# Patient Record
Sex: FEMALE | Race: WHITE | Employment: OTHER | URBAN - METROPOLITAN AREA
[De-identification: names, ages, dates, MRNs, and addresses within clinical notes are randomized per-mention and may not be internally consistent; named-entity substitution may affect disease eponyms.]

---

## 2019-09-27 ENCOUNTER — OFFICE VISIT (OUTPATIENT)
Dept: SLEEP MEDICINE | Facility: CLINIC | Age: 74
End: 2019-09-27
Payer: MEDICARE

## 2019-09-27 VITALS
HEART RATE: 76 BPM | HEIGHT: 70 IN | RESPIRATION RATE: 16 BRPM | BODY MASS INDEX: 36.08 KG/M2 | SYSTOLIC BLOOD PRESSURE: 158 MMHG | WEIGHT: 252 LBS | OXYGEN SATURATION: 95 % | DIASTOLIC BLOOD PRESSURE: 76 MMHG

## 2019-09-27 DIAGNOSIS — G47.33 OSA (OBSTRUCTIVE SLEEP APNEA): Primary | ICD-10-CM

## 2019-09-27 PROCEDURE — 99204 OFFICE O/P NEW MOD 45 MIN: CPT | Performed by: INTERNAL MEDICINE

## 2019-09-27 RX ORDER — AMLODIPINE BESYLATE 10 MG/1
10 TABLET ORAL
COMMUNITY
Start: 2019-02-26

## 2019-09-27 RX ORDER — LIRAGLUTIDE 6 MG/ML
INJECTION SUBCUTANEOUS
COMMUNITY
Start: 2019-09-02

## 2019-09-27 RX ORDER — GABAPENTIN 100 MG/1
CAPSULE ORAL
Refills: 1 | COMMUNITY
Start: 2019-06-25

## 2019-09-27 RX ORDER — FUROSEMIDE 20 MG
TABLET ORAL
COMMUNITY
Start: 2019-01-15

## 2019-09-27 RX ORDER — ATORVASTATIN CALCIUM 20 MG/1
20 TABLET, FILM COATED ORAL DAILY
Refills: 3 | COMMUNITY
Start: 2019-09-20

## 2019-09-27 RX ORDER — METOPROLOL SUCCINATE 50 MG/1
TABLET, EXTENDED RELEASE ORAL
Refills: 0 | COMMUNITY
Start: 2019-03-28

## 2019-09-27 RX ORDER — INSULIN GLARGINE 100 [IU]/ML
22 INJECTION, SOLUTION SUBCUTANEOUS
COMMUNITY
Start: 2019-04-26

## 2019-09-27 RX ORDER — METFORMIN HCL 500 MG
2000 TABLET, EXTENDED RELEASE 24 HR ORAL
COMMUNITY
Start: 2019-08-05

## 2019-09-27 RX ORDER — DULOXETIN HYDROCHLORIDE 30 MG/1
CAPSULE, DELAYED RELEASE ORAL
Refills: 0 | COMMUNITY
Start: 2019-04-25 | End: 2021-10-01

## 2019-09-27 RX ORDER — LORAZEPAM 0.5 MG/1
TABLET ORAL
COMMUNITY
Start: 2018-12-11 | End: 2021-10-01

## 2019-09-27 RX ORDER — BUPROPION HYDROCHLORIDE 150 MG/1
150 TABLET ORAL
COMMUNITY
Start: 2019-09-09

## 2019-09-27 RX ORDER — LEVOTHYROXINE SODIUM 150 UG/1
150 TABLET ORAL
COMMUNITY
Start: 2019-03-26

## 2019-09-27 RX ORDER — POTASSIUM CHLORIDE 1500 MG/1
TABLET, EXTENDED RELEASE ORAL
COMMUNITY
Start: 2019-01-15

## 2019-09-27 ASSESSMENT — MIFFLIN-ST. JEOR: SCORE: 1710.37

## 2019-09-27 NOTE — PROGRESS NOTES
Sleep Consultation:    Date on this visit: 9/27/2019    Shobha Juares is sent by No ref. provider found for a sleep consultation regarding sleep apnea.    Primary Physician: Mary Harris     Chief complaint: sleep apnea    Presenting History:     Shobha Juares presents to clinic today to establish care for her sleep apnea which she is treating with CPAP.     Her medical history is significant for HTN, depression, DM_2 and hypothyroidism.      Sleep apnea was diagnosed in 2005 at [facility]. She was told that she has severe sleep apnea. Previous test results are not available for review.     Overall, she rates the experience with PAP as 8 (0 poor, 10 great). The mask is comfortable. The mask is leaking, some nights per week.  She is snoring with the mask on. She is not having gasp arousals.  She is not having significant oral/nasal dryness. The pressure settings are comfortable.     She uses nasal pillows.     She does feel rested in the morning.    [place] Sleepiness Scale: 2/24    ResMed     Auto-PAP 11-15 cmH2O download:  90/90 total days of use. 0 nonuse days. 90/90 days with >4 hours use.  Average use 9 hours 40 minutes per day. Median Leak 36 L/min. 95%ile Leak 64 L/min. CPAP 95% pressure 13.1cm. AHI 0.2    Shobha goes to sleep at 11:30 PM during the week. She wakes up at 9:00 AM without an alarm. She falls asleep in 30- 60 minutes.  Shobha has difficulty falling asleep.  She wakes up 2-4 times a night for 20 minutes before falling back to sleep.  Shobha wakes up to go to the bathroom.  On weekends, Shobha goes to sleep at 12:00 AM.  She wakes up at 9:00 AM without an alarm. She falls asleep in 60 minutes.  Patient gets an average of 7 hours of sleep per night.     Patient sleeps on her back. Shobha denies any bruxism, sleep walking, sleep talking, dream enactment, sleep paralysis, cataplexy and hypnogogic/hypnopompic hallucinations.    Shobha denies difficulty breathing through her nose.       Shobha naps 5-6 times per week for  minutes, feels refreshed after naps. She takes no inadvertant naps.  She denies closing eyes, dozing and falling asleep while driving. Patient was counseled on the importance of driving while alert, to pull over if drowsy, or nap before getting into the vehicle if sleepy.      She uses 1-2 cups/day of tea. Last caffeine intake is usually before 4 pm.    Allergies:    Allergies   Allergen Reactions     Oxycodone Itching     Trazodone      Piroxicam Rash     Triamterene Rash     Generalized rash  PN: LW Reaction: Rash, Generalized         Medications:    Current Outpatient Medications   Medication Sig Dispense Refill     amLODIPine (NORVASC) 10 MG tablet Take 10 mg by mouth       aspirin (ASA) 81 MG tablet Take 1 tablet by mouth every 24 hours       buPROPion (WELLBUTRIN XL) 150 MG 24 hr tablet Take 150 mg by mouth       furosemide (LASIX) 20 MG tablet TAKE 1 TABLET BY MOUTH EVERY 24 HOURS       insulin glargine (BASAGLAR KWIKPEN) 100 UNIT/ML pen Inject 22 Units Subcutaneous       levothyroxine (SYNTHROID/LEVOTHROID) 150 MCG tablet Take 150 mcg by mouth       liraglutide (VICTOZA PEN) 18 MG/3ML solution INJECT 1.8MG UNDER THE SKIN EVERY DAY       LORazepam (ATIVAN) 0.5 MG tablet TAKE 1 TABLET BY MOUTH 4 TO 5 TIMES DAILY AS NEEDED       metFORMIN (GLUCOPHAGE-XR) 500 MG 24 hr tablet Take 2,000 mg by mouth       potassium chloride ER (K-DUR/KLOR-CON M) 20 MEQ CR tablet TAKE 1 TABLET BY MOUTH EVERY 24 HOURS       atorvastatin (LIPITOR) 20 MG tablet Take 20 mg by mouth daily  3     DULoxetine (CYMBALTA) 30 MG capsule TAKE 3 CAPSULES BY MOUTH ONCE DAILY  0     gabapentin (NEURONTIN) 100 MG capsule TAKE 1 TO 2 CAPSULES BY MOUTH 2-3 TIMES A DAY  1     metoprolol succinate ER (TOPROL-XL) 50 MG 24 hr tablet TAKE 1 TABLET BY MOUTH TWICE A DAY  0       Problem List:  There are no active problems to display for this patient.       Past Medical/Surgical History:    1. Hypertension  2.  DM_2  3. Depression   4. Hypothyroidism     Social History:  Social History     Socioeconomic History     Marital status:      Spouse name: Not on file     Number of children: Not on file     Years of education: Not on file     Highest education level: Not on file   Occupational History     Not on file   Social Needs     Financial resource strain: Not on file     Food insecurity:     Worry: Not on file     Inability: Not on file     Transportation needs:     Medical: Not on file     Non-medical: Not on file   Tobacco Use     Smoking status: Former Smoker     Packs/day: 0.00     Smokeless tobacco: Never Used     Tobacco comment: long time ago, short amount of time   Substance and Sexual Activity     Alcohol use: Not on file     Drug use: Not on file     Sexual activity: Not on file   Lifestyle     Physical activity:     Days per week: Not on file     Minutes per session: Not on file     Stress: Not on file   Relationships     Social connections:     Talks on phone: Not on file     Gets together: Not on file     Attends Tenriism service: Not on file     Active member of club or organization: Not on file     Attends meetings of clubs or organizations: Not on file     Relationship status: Not on file     Intimate partner violence:     Fear of current or ex partner: Not on file     Emotionally abused: Not on file     Physically abused: Not on file     Forced sexual activity: Not on file   Other Topics Concern     Not on file   Social History Narrative     Not on file       Family History:    - Father had sleep apnea     Review of Systems:  A complete review of systems reviewed by me is negative with the exeption of what has been mentioned in the history of present illness.  CONSTITUTIONAL: NEGATIVE for weight gain/loss, fever, chills, sweats or night sweats, drug allergies.  EYES: NEGATIVE for changes in vision, blind spots, double vision.  ENT: NEGATIVE for ear pain, sore throat, sinus pain, post-nasal drip,  "runny nose, bloody nose  CARDIAC:  POSITIVE for  swollen legs  NEUROLOGIC:  POSITIVE for  headaches and weakness or numbness in the arms or legs  DERMATOLOGIC: NEGATIVE for rashes, new moles or change in mole(s)  PULMONARY:  POSITIVE for  SOB with activity  GASTROINTESTINAL: NEGATIVE for nausea or vomitting, loose or watery stools, fat or grease in stools, constipation, abdominal pain, bowel movements black in color or blood noted.  GENITOURINARY: NEGATIVE for pain during urination, blood in urine, urinating more frequently than usual, irregular menstrual periods.  MUSCULOSKELETAL:  POSITIVE for  bone or joint pain  ENDOCRINE: NEGATIVE for increased thirst or urination, diabetes.  LYMPHATIC: NEGATIVE for swollen lymph nodes, lumps or bumps in the breasts or nipple discharge.    Physical Examination:  Vitals: BP (!) 158/76   Pulse 76   Resp 16   Ht 1.765 m (5' 9.5\")   Wt 114.3 kg (252 lb)   SpO2 95%   BMI 36.68 kg/m    BMI= Body mass index is 36.68 kg/m .    Neck Cir (cm): 38 cm    [place] Total Score 9/27/2019   Total score - [place] 2       JUVE Total Score: 21 (09/27/19 1525)    GENERAL APPEARANCE: healthy, alert and no distress  EYES: Eyes grossly normal to inspection, PERRL and conjunctivae and sclerae normal  HENT: nose and mouth without ulcers or lesions, oropharynx crowded, soft palate dependent and tongue base enlarged  NECK: no adenopathy, no asymmetry, masses, or scars and thyroid normal to palpation  RESP: lungs clear to auscultation - no rales, rhonchi or wheezes  CV: regular rates and rhythm, normal S1 S2, no S3 or S4 and no murmur, click or rub  ABDOMEN: soft, nontender, without hepatosplenomegaly or masses and bowel sounds normal  MS: extremities normal- no gross deformities noted  NEURO: Normal strength and tone, mentation intact and speech normal  PSYCH: mentation appears normal and affect normal/bright  Mallampati Class: IV.  Tonsillar Stage: 1  hidden by pillars.    Impression/Plan:    1. " Obstructive sleep apnea  2. Hypertension   3. DM-2   4. Depression     -  Patient is reports to have severe sleep apnea at baseline. She is on auto titrating PAP therapy, which she uses regularly and report subjective benefit. However, mouth breathing is an issue. She doesnot tolerate full face mask or chin strap.     Adequate treatment of her sleep apnea is indicated by Regular compliance and normal AHI demonstrated on download.     Plan:     1.  Continue auto PAP therapy 11- 15 cm H2O.   2. She wants to transfer DME services. A copy of her previous PSG result is requested to facilitate this     Obstructive sleep apnea reviewed.  CPAP download reviewed.   Complications of untreated sleep apnea were reviewed.    Dr. Kai Mohan    CC: No ref. provider found

## 2019-09-27 NOTE — PATIENT INSTRUCTIONS
Your BMI is Body mass index is 36.68 kg/m .  Weight management is a personal decision.  If you are interested in exploring weight loss strategies, the following discussion covers the approaches that may be successful. Body mass index (BMI) is one way to tell whether you are at a healthy weight, overweight, or obese. It measures your weight in relation to your height.  A BMI of 18.5 to 24.9 is in the healthy range. A person with a BMI of 25 to 29.9 is considered overweight, and someone with a BMI of 30 or greater is considered obese. More than two-thirds of American adults are considered overweight or obese.  Being overweight or obese increases the risk for further weight gain. Excess weight may lead to heart disease and diabetes.  Creating and following plans for healthy eating and physical activity may help you improve your health.  Weight control is part of healthy lifestyle and includes exercise, emotional health, and healthy eating habits. Careful eating habits lifelong are the mainstay of weight control. Though there are significant health benefits from weight loss, long-term weight loss with diet alone may be very difficult to achieve- studies show long-term success with dietary management in less than 10% of people. Attaining a healthy weight may be especially difficult to achieve in those with severe obesity. In some cases, medications, devices and surgical management might be considered.  What can you do?  If you are overweight or obese and are interested in methods for weight loss, you should discuss this with your provider.     Consider reducing daily calorie intake by 500 calories.     Keep a food journal.     Avoiding skipping meals, consider cutting portions instead.    Diet combined with exercise helps maintain muscle while optimizing fat loss. Strength training is particularly important for building and maintaining muscle mass. Exercise helps reduce stress, increase energy, and improves fitness.  Increasing exercise without diet control, however, may not burn enough calories to loose weight.       Start walking three days a week 10-20 minutes at a time    Work towards walking thirty minutes five days a week     Eventually, increase the speed of your walking for 1-2 minutes at time    In addition, we recommend that you review healthy lifestyles and methods for weight loss available through the National Institutes of Health patient information sites:  http://win.niddk.nih.gov/publications/index.htm    And look into health and wellness programs that may be available through your health insurance provider, employer, local community center, or shawn club.    Weight management plan: Patient was referred to their PCP to discuss a diet and exercise plan.   Shobha to follow up with Primary Care provider regarding elevated blood pressure.

## 2019-09-27 NOTE — NURSING NOTE
"Chief Complaint   Patient presents with     Sleep Problem     Establish care, need supplies       Initial BP (!) 158/76   Pulse 76   Resp 16   Ht 1.765 m (5' 9.5\")   Wt 114.3 kg (252 lb)   SpO2 95%   BMI 36.68 kg/m   Estimated body mass index is 36.68 kg/m  as calculated from the following:    Height as of this encounter: 1.765 m (5' 9.5\").    Weight as of this encounter: 114.3 kg (252 lb).    Medication Reconciliation: complete     ESS 2  Neck 38cm  Fabiola Ramos MA        "

## 2019-09-30 ENCOUNTER — DOCUMENTATION ONLY (OUTPATIENT)
Dept: SLEEP MEDICINE | Facility: CLINIC | Age: 74
End: 2019-09-30

## 2019-09-30 NOTE — PROGRESS NOTES
Previous sleep test results from [facility] were reviewed.     PSG was on 07/01/2003 at weight 198 lbs.     AHI was 100 per hour with adequate PAP titration at 10 cm H2O.     Impression: Severe TOM at baseline with  per hour in 2003.

## 2020-10-01 NOTE — PROGRESS NOTES
"Shobha Juares is a 75 year old female who is being evaluated via a billable telephone visit.      The patient has been notified of following:     \"This telephone visit will be conducted via a call between you and your physician/provider. We have found that certain health care needs can be provided without the need for a physical exam.  This service lets us provide the care you need with a short phone conversation.  If a prescription is necessary we can send it directly to your pharmacy.  If lab work is needed we can place an order for that and you can then stop by our lab to have the test done at a later time.    Telephone visits are billed at different rates depending on your insurance coverage. During this emergency period, for some insurers they may be billed the same as an in-person visit.  Please reach out to your insurance provider with any questions.    If during the course of the call the physician/provider feels a telephone visit is not appropriate, you will not be charged for this service.\"    Patient has given verbal consent for Telephone visit?  Yes    What phone number would you like to be contacted at? [phone]    How would you like to obtain your AVS? MyChart    Phone call duration: 15 minutes    Kai Mohan MD      Obstructive Sleep Apnea - PAP Follow-Up Visit:    Chief Complaint   Patient presents with     RECHECK     reese, ess added 9/23/2020  annual pap        Shobha Juares comes in today for follow-up of their severe sleep apnea, managed with CPAP.     PSG on 7/01/2003 at weight of 198 lbs showed an AHI of 100 per hour.     Overall, she rates the experience with PAP as ok. The mask is comfortable. The mask is not leaking.  She is not snoring with the mask on. She is not having gasp arousals.  She is not having significant oral/nasal dryness. The pressure settings are comfortable.     She uses nasal pillows.     Bedtime is typically 11:30 pm. Usually it takes about 60 minutes to fall " asleep with the mask on. Patient has sleep maintenance difficulty and has light interrupted sleep after waking up between 2-3 am. Wake time is typically 8:30 am.  She is currently in the process of tapering and discontinuing Lorazepam. Patient is using PAP therapy 9 hours per night. The patient is usually getting 8- 9 hours of sleep per night.    She does feel rested in the morning.    Total score - [place]: 6 (9/23/2020  1:56 PM)    ResMed     Auto-PAP 11-15 cmH2O download:  61/90 total days of use. 68% days with >4 hours use.  Average use 9 hours 15 minutes per day. Median Leak 34 L/min. 95%ile Leak 60.5 L/min. CPAP 95% pressure 11.6cm. AHI 0.1      Reviewed by team:  Allergies  Meds            Reviewed by provider:                Problem List:  There are no active problems to display for this patient.         There were no vitals taken for this visit.    Impression/Plan:     Severe sleep apnea.     -  Tolerating PAP well. Daytime symptoms are stable.    - I reviewed data from her CPAP device which shows regular compliance and normal AHI indicating adequate treatment.     - Her CPAP is reported to be malfunctioning and she is in need of replacement.     Plan:    1. Continue auto PAP therapy. She will obtain replacement device through [facility] will follow up in about 1 year(s).     Fifteen minutes spent with patient, all of which were spent  counseling, consulting, coordinating plan of care.      Kai Mohan MD, MD    CC:  Mary Harris,

## 2020-10-02 ENCOUNTER — VIRTUAL VISIT (OUTPATIENT)
Dept: SLEEP MEDICINE | Facility: CLINIC | Age: 75
End: 2020-10-02
Payer: MEDICARE

## 2020-10-02 DIAGNOSIS — G47.33 OSA (OBSTRUCTIVE SLEEP APNEA): Primary | ICD-10-CM

## 2020-10-02 PROCEDURE — 99442 PR PHYSICIAN TELEPHONE EVALUATION 11-20 MIN: CPT | Performed by: INTERNAL MEDICINE

## 2020-10-02 NOTE — PATIENT INSTRUCTIONS
Your There is no height or weight on file to calculate BMI.  Weight management is a personal decision.  If you are interested in exploring weight loss strategies, the following discussion covers the approaches that may be successful. Body mass index (BMI) is one way to tell whether you are at a healthy weight, overweight, or obese. It measures your weight in relation to your height.  A BMI of 18.5 to 24.9 is in the healthy range. A person with a BMI of 25 to 29.9 is considered overweight, and someone with a BMI of 30 or greater is considered obese. More than two-thirds of American adults are considered overweight or obese.  Being overweight or obese increases the risk for further weight gain. Excess weight may lead to heart disease and diabetes.  Creating and following plans for healthy eating and physical activity may help you improve your health.  Weight control is part of healthy lifestyle and includes exercise, emotional health, and healthy eating habits. Careful eating habits lifelong are the mainstay of weight control. Though there are significant health benefits from weight loss, long-term weight loss with diet alone may be very difficult to achieve- studies show long-term success with dietary management in less than 10% of people. Attaining a healthy weight may be especially difficult to achieve in those with severe obesity. In some cases, medications, devices and surgical management might be considered.  What can you do?  If you are overweight or obese and are interested in methods for weight loss, you should discuss this with your provider.     Consider reducing daily calorie intake by 500 calories.     Keep a food journal.     Avoiding skipping meals, consider cutting portions instead.    Diet combined with exercise helps maintain muscle while optimizing fat loss. Strength training is particularly important for building and maintaining muscle mass. Exercise helps reduce stress, increase energy, and  improves fitness. Increasing exercise without diet control, however, may not burn enough calories to loose weight.       Start walking three days a week 10-20 minutes at a time    Work towards walking thirty minutes five days a week     Eventually, increase the speed of your walking for 1-2 minutes at time    In addition, we recommend that you review healthy lifestyles and methods for weight loss available through the National Institutes of Health patient information sites:  http://win.niddk.nih.gov/publications/index.htm    And look into health and wellness programs that may be available through your health insurance provider, employer, local community center, or shawn club.

## 2020-10-06 ENCOUNTER — DOCUMENTATION ONLY (OUTPATIENT)
Dept: SLEEP MEDICINE | Facility: CLINIC | Age: 75
End: 2020-10-06

## 2020-10-06 NOTE — PROGRESS NOTES
10/6/2020- [facility]  SHE WILL RECIVE  A CALL TO SCHEDULE REPLACEMENT.   I TOLD HER TO CALL US IN A WEEK IF SHE HAS NOT RECD CALL.

## 2020-10-09 ENCOUNTER — TELEPHONE (OUTPATIENT)
Dept: SLEEP MEDICINE | Facility: CLINIC | Age: 75
End: 2020-10-09

## 2020-10-09 NOTE — TELEPHONE ENCOUNTER
I called patient to schedule CPAP replacement setup appointment [facility]. Left voicemail for her to call me back at my direct line to schedule.

## 2020-10-16 ENCOUNTER — APPOINTMENT (OUTPATIENT)
Dept: SLEEP MEDICINE | Facility: CLINIC | Age: 75
End: 2020-10-16
Payer: MEDICARE

## 2020-10-16 ENCOUNTER — DOCUMENTATION ONLY (OUTPATIENT)
Dept: SLEEP MEDICINE | Facility: CLINIC | Age: 75
End: 2020-10-16

## 2020-10-16 NOTE — PROGRESS NOTES
Patient was offered choice of vendor and chose [facility].  Patient Shobha Juares was set up at [facility]  on October 16, 2020. Patient received a Resmed AirSense 10 Auto. Pressures were set at 11-15 cm H2O.   Patient s ramp is off and FLEX/EPR is EPR, 2.  Patient received a Resmed Mask name: Sanon FX Elo Pillow mask size Standard, heated tubing and heated humidifier.  Patient does not need to meet compliance. Patient has a follow up tbd.   Melsysa Ramirez

## 2020-10-19 ENCOUNTER — DOCUMENTATION ONLY (OUTPATIENT)
Dept: SLEEP MEDICINE | Facility: CLINIC | Age: 75
End: 2020-10-19
Payer: MEDICARE

## 2020-10-19 NOTE — PROGRESS NOTES
3 DAY STM VISIT    Diagnostic AHI:   PSG    Patient contacted for 3 day STM visit.    Confirmed with patient at time of call- N/A Patient is still interested in STM service.     Message left for patient to return call.    Replacement device: Yes  STM ordered by provider: Yes     Device type: Auto-CPAP  PAP settings from order::  CPAP min 11 cm  H20       CPAP max 15 cm  H20  Mask type:    Nasal Pillows     Device settings from machine      Min CPAP 11.0            Max CPAP 15.0          EPR level Setting: TWO      RESMED Soft response setting:  OFF  Assessment: No usage reporting but has a profile in [employer]view/[employer].  Action plan: Patient to have 14 day STM visit. Patient has a follow up visit scheduled:   no.     Total time spent on accessing and  interpreting remote patient PAP therapy data  10 minutes  Total time spent counseling, coaching  and reviewing PAP therapy data with patient  1 minutes  [id number] no                HPI      ROS      Physical Exam

## 2020-11-03 ENCOUNTER — DOCUMENTATION ONLY (OUTPATIENT)
Dept: SLEEP MEDICINE | Facility: CLINIC | Age: 75
End: 2020-11-03

## 2020-11-03 NOTE — PROGRESS NOTES
14  DAY STM VISIT         Subjective measures:   Patient reports machine is working fine.  She has gone back to her old mask.      Assessment: Pt meeting objective benchmarks.  Patient meeting subjective benchmarks.     Action plan: schedule mask fit appointment follow up pt provider request. Removed from further STM as this was a replacement        Device type: Auto-CPAP    PAP settings: CPAP min 11.0 cm  H20       CPAP max 15.0 cm  H20      95th% pressure 12.3 cm  H20        RESMED EPR level Setting: TWO    RESMED Soft response setting:  OFF    Mask type:  Nasal Pillows    Objective measures: 14 day rolling measures      Compliance  92 %      Leak  60.09  lpm  last  upload      AHI 0.36   last  upload      Average number of minutes 559      Objective measure goal  Compliance   Goal >70%  Leak   Goal < 24 lpm  AHI  Goal < 5  Usage  Goal >240        Total time spent on accessing and interpreting remote patient PAP therapy data  10 minutes    Total time spent counseling, coaching  and reviewing PAP therapy data with patient  3 minutes    [id number]  [id number]  no (3 day STM)

## 2020-11-16 ENCOUNTER — HEALTH MAINTENANCE LETTER (OUTPATIENT)
Age: 75
End: 2020-11-16

## 2021-09-18 ENCOUNTER — HEALTH MAINTENANCE LETTER (OUTPATIENT)
Age: 76
End: 2021-09-18

## 2021-10-01 VITALS — HEIGHT: 70 IN | WEIGHT: 240 LBS | BODY MASS INDEX: 34.36 KG/M2

## 2021-10-01 ASSESSMENT — MIFFLIN-ST. JEOR: SCORE: 1640.94

## 2021-10-01 NOTE — PROGRESS NOTES
"Shobha is a 76 year old who is being evaluated via a billable video visit.      How would you like to obtain your AVS? MyChart  If the video visit is dropped, the invitation should be resent by: Text to cell phone: [phone]  Will anyone else be joining your video visit? No      Video Start Time: 11:35 AM  Video-Visit Details    Type of service:  Video Visit    Video End Time:11:45 AM    Originating Location (pt. Location): Home    Distant Location (provider location):  [facility] [place]     Platform used for Video Visit: Well    Obstructive Sleep Apnea - PAP Follow-Up Visit:    Chief Complaint   Patient presents with     CPAP Follow Up       Shobha Juares comes in today for follow-up of their severe sleep apnea, managed with CPAP.     PSG on 7/01/2003 at weight of 198 lbs showed an AHI of 100 per hour.     Overall, she rates the experience with PAP as ok. The mask is not comfortable. The mask is not leaking.  She is not snoring with the mask on. She is not having gasp arousals.  She is not having significant oral/nasal dryness. The pressure settings are comfortable.     She uses nasal pillows.     Bedtime is typically 11:30 PM. Usually it takes about 30-60 minutes to fall asleep with the mask on. Wake time is typically 5:30 am.  Patient thinks that she is usually awake for the rest of the morning until leaving the bed at 7:30 am.    She does feel rested in the morning.    Total score - [place]: 5 (10/1/2021 11:42 AM)    ResMed     Auto-PAP 11-15 cmH2O download:  30/30 total days of use. 0 nonuse days. 100% days with >4 hours use.  Average use 9 hours 21 minutes per day. Median Leak 45 L/min. 95%ile Leak 63 L/min. CPAP 95% pressure 12.9cm. AHI 0.2      Reviewed by team: Tobacco  Allergies  Meds            Reviewed by provider:                Problem List:  There are no problems to display for this patient.         Ht 1.765 m (5' 9.5\")   Wt 108.9 kg (240 lb)   BMI 34.93 kg/m  "     Impression/Plan:     Severe sleep apnea.     -  Patient is tolerating PAP and download data shows regular compliance and normal AHI. She tends to open her mouth and there seems to be some mouth leak with use of nasal pillows. She has tried chinstrap which did not help. Currently, [facility] applies tape to seal her mouth.     Plan:     - Continue auto PAP therapy   - Patient will try a full face mask through DME    Shobha Juares will follow up in about 1 year(s).     I spent a total of 22 minutes for this appointment today which include time spent before, during and after the visit for patient care, counseling and coordination of care.    Kai Mohan MD    CC:  Mary Harris,

## 2021-10-01 NOTE — PATIENT INSTRUCTIONS
Your BMI is Body mass index is 34.93 kg/m .    What is BMI?  Body mass index (BMI) is one way to tell whether you are at a healthy weight, overweight, or obese. It measures your weight in relation to your height.  A BMI of 18.5 to 24.9 is in the healthy range. A person with a BMI of 25 to 29.9 is considered overweight, and someone with a BMI of 30 or greater is considered obese.  Another way to find out if you are at risk for health problems caused by overweight and obesity is to measure your waist. If you are a woman and your waist is more than 35 inches, or if you are a man and your waist is more than 40 inches, your risk of disease may be higher.  More than two-thirds of American adults are considered overweight or obese. Being overweight or obese increases the risk for further weight gain.  Excess weight may lead to heart disease and diabetes. Creating and following plans for healthy eating and physical activity may help you improve your health.    Methods for maintaining or losing weight.  Weight control is part of healthy lifestyle and includes exercise, emotional health, and healthy eating habits.  Careful eating habits lifelong is the mainstay of weight control.  Though there are significant health benefits from weight loss, long-term weight loss with diet alone may be very difficult to achieve- studies show long-term success with dietary management in less than 10% of people. Attaining a healthy weight may be especially difficult to achieve in those with severe obesity. In some cases, medications, devices and surgical management might be considered.    What can you do?  If you are overweight or obese and are interested in methods for weight loss, you should discuss this with your provider. In addition, we recommend that you review healthy life styles and methods for weight loss available through the National Institutes of Health patient information sites:    http://win.niddk.nih.gov/publications/index.htm            Your Body mass index is 34.93 kg/m .  Weight management is a personal decision.  If you are interested in exploring weight loss strategies, the following discussion covers the approaches that may be successful. Body mass index (BMI) is one way to tell whether you are at a healthy weight, overweight, or obese. It measures your weight in relation to your height.  A BMI of 18.5 to 24.9 is in the healthy range. A person with a BMI of 25 to 29.9 is considered overweight, and someone with a BMI of 30 or greater is considered obese. More than two-thirds of American adults are considered overweight or obese.  Being overweight or obese increases the risk for further weight gain. Excess weight may lead to heart disease and diabetes.  Creating and following plans for healthy eating and physical activity may help you improve your health.  Weight control is part of healthy lifestyle and includes exercise, emotional health, and healthy eating habits. Careful eating habits lifelong are the mainstay of weight control. Though there are significant health benefits from weight loss, long-term weight loss with diet alone may be very difficult to achieve- studies show long-term success with dietary management in less than 10% of people. Attaining a healthy weight may be especially difficult to achieve in those with severe obesity. In some cases, medications, devices and surgical management might be considered.  What can you do?  If you are overweight or obese and are interested in methods for weight loss, you should discuss this with your provider.     Consider reducing daily calorie intake by 500 calories.     Keep a food journal.     Avoiding skipping meals, consider cutting portions instead.    Diet combined with exercise helps maintain muscle while optimizing fat loss. Strength training is particularly important for building and maintaining muscle mass. Exercise helps  reduce stress, increase energy, and improves fitness. Increasing exercise without diet control, however, may not burn enough calories to loose weight.       Start walking three days a week 10-20 minutes at a time    Work towards walking thirty minutes five days a week     Eventually, increase the speed of your walking for 1-2 minutes at time    In addition, we recommend that you review healthy lifestyles and methods for weight loss available through the National Institutes of Health patient information sites:  http://win.niddk.nih.gov/publications/index.htm    And look into health and wellness programs that may be available through your health insurance provider, employer, local community center, or shawn club.    {Weight Management Plan -- Delete if patient has a normal BMI:330978}

## 2021-10-04 ENCOUNTER — VIRTUAL VISIT (OUTPATIENT)
Dept: SLEEP MEDICINE | Facility: CLINIC | Age: 76
End: 2021-10-04
Payer: MEDICARE

## 2021-10-04 DIAGNOSIS — G47.33 OSA (OBSTRUCTIVE SLEEP APNEA): Primary | ICD-10-CM

## 2021-10-04 PROCEDURE — 99213 OFFICE O/P EST LOW 20 MIN: CPT | Mod: 95 | Performed by: INTERNAL MEDICINE

## 2021-10-04 NOTE — NURSING NOTE
1 year appointment reminder card created and will be mailed when appropriate. DME orders have been automatically faxed to [facility].  Dorita Rueda, CMA

## 2022-01-08 ENCOUNTER — HEALTH MAINTENANCE LETTER (OUTPATIENT)
Age: 77
End: 2022-01-08

## 2022-06-10 ENCOUNTER — TELEPHONE (OUTPATIENT)
Dept: SLEEP MEDICINE | Facility: CLINIC | Age: 77
End: 2022-06-10
Payer: MEDICARE

## 2022-06-10 NOTE — TELEPHONE ENCOUNTER
[facility] (Pulmonary and Sleep) NURSE CALLED AND WANTED TO SEE [facility]. WENT INTO [facility] AND MADE [facility] AN INTEGRATOR AND GAVE THEM ALL ACCESS TO [facility] FOR CONTINUATION OF CARE. -ARS

## 2022-11-20 ENCOUNTER — HEALTH MAINTENANCE LETTER (OUTPATIENT)
Age: 77
End: 2022-11-20

## 2024-09-18 ENCOUNTER — TELEPHONE (OUTPATIENT)
Dept: WOUND CARE | Facility: CLINIC | Age: 79
End: 2024-09-18
Payer: MEDICARE

## 2024-09-18 NOTE — TELEPHONE ENCOUNTER
Does the patient have an open and draining wound? Yes    Please schedule with Fani Daley D.P.M. or Luis Belcher D.P.M. at [facility] [place] for next available appointment.    **For all providers, please schedule a follow up 4 weeks after initial appointment. (2-3 weeks for Dr. Daley and Dr. Belcher)**  --If unable to schedule within 2-3 weeks then please place on cancellation list--    Is the patient able to make their own medical decisions? Yes    Can the patient be scheduled on a Thursday (Jak/Mason (starting in November))? Yes    Is patient a [facility] lift? PLEASE INQUIRE WHEN MAKING THE APPOINTMENT AND PUT IN APPOINTMENT NOTES    Routing to  Wound Healing Scheduling.

## 2024-09-18 NOTE — TELEPHONE ENCOUNTER
New patient requesting an appointment for a wound on the 2nd toe of the right foot. Patient states that it is currently open and draining.

## 2024-09-27 ENCOUNTER — HOSPITAL ENCOUNTER (OUTPATIENT)
Dept: WOUND CARE | Facility: CLINIC | Age: 79
Discharge: HOME | End: 2024-09-27
Attending: ASSISTANT, PODIATRIC | Admitting: ASSISTANT, PODIATRIC
Payer: MEDICARE

## 2024-09-27 VITALS
HEART RATE: 67 BPM | SYSTOLIC BLOOD PRESSURE: 138 MMHG | DIASTOLIC BLOOD PRESSURE: 64 MMHG | WEIGHT: 225.6 LBS | HEIGHT: 70 IN | BODY MASS INDEX: 32.3 KG/M2 | TEMPERATURE: 96.9 F

## 2024-09-27 DIAGNOSIS — L89.893 PRESSURE ULCER OF TOE OF RIGHT FOOT, STAGE 3 (H): Primary | ICD-10-CM

## 2024-09-27 DIAGNOSIS — E11.621 TYPE 2 DIABETES MELLITUS WITH FOOT ULCER (CODE) (H): ICD-10-CM

## 2024-09-27 PROBLEM — L97.512 ULCER OF RIGHT SECOND TOE WITH FAT LAYER EXPOSED (H): Status: ACTIVE | Noted: 2024-09-27

## 2024-09-27 PROCEDURE — 11042 DBRDMT SUBQ TIS 1ST 20SQCM/<: CPT | Performed by: ASSISTANT, PODIATRIC

## 2024-09-27 PROCEDURE — G0463 HOSPITAL OUTPT CLINIC VISIT: HCPCS | Mod: 25

## 2024-09-27 NOTE — PROGRESS NOTES
[facility] [place] Progress Note    Subject: Patient was seen at wound center.  Patient is an 78-year-old diabetic female seen today for evaluation of right plantar second toe wound.  Patient states that she believes the wound developed because she has toes that are rubbing on each other and pushing on each other.  This has been going on for years but the wound has developed earlier in the summer.  Has been open all summer.  Patient notes that she has been keeping an eye on it, keeping it clean and covered but it does not seem to want to heal fully.  Patient denies any increased redness, swelling or significant drainage from the area.  Denies any malodor or overall feeling of unwell/illness.  She denies any trauma to the area, does not believe she stepped on something or got anything stuck in the wound area.  States she wears wide toe box shoes or open toe shoes mostly.  Avoids being barefoot around the house if possible.  Tries to check her feet daily, has been completing wound care on her own.  States there is pain to the wound sometimes rated 3 or 4 out of 10 but mostly is minimal.  Reports some numbness and tingling to her toes at times.    PMH: No past medical history on file.    Social Hx:   Social History     Socioeconomic History    Marital status:      Spouse name: Not on file    Number of children: Not on file    Years of education: Not on file    Highest education level: Not on file   Occupational History    Not on file   Tobacco Use    Smoking status: Former     Types: Cigarettes    Smokeless tobacco: Never    Tobacco comments:     long time ago, short amount of time   Substance and Sexual Activity    Alcohol use: Not on file    Drug use: Not on file    Sexual activity: Not on file   Other Topics Concern    Not on file   Social History Narrative    Not on file     Social Determinants of Health     Financial Resource Strain: Not on file   Food Insecurity: Not on file   Transportation  Needs: Not on file   Physical Activity: Not on file   Stress: Not on file   Social Connections: Not on file   Interpersonal Safety: Low Risk  (9/27/2024)    Interpersonal Safety     Do you feel physically and emotionally safe where you currently live?: Yes     Within the past 12 months, have you been hit, slapped, kicked or otherwise physically hurt by someone?: No     Within the past 12 months, have you been humiliated or emotionally abused in other ways by your partner or ex-partner?: No   Housing Stability: Not on file       Surgical Hx: No past surgical history on file.    Allergies:    Allergies   Allergen Reactions    Oxycodone Itching    Trazodone     Piroxicam Rash    Triamterene Rash     Generalized rash  PN: LW Reaction: Rash, Generalized         Medications:   Current Outpatient Medications   Medication Sig Dispense Refill    amLODIPine (NORVASC) 10 MG tablet Take 10 mg by mouth      aspirin (ASA) 81 MG tablet Take 1 tablet by mouth every 24 hours      atorvastatin (LIPITOR) 20 MG tablet Take 20 mg by mouth daily  3    buPROPion (WELLBUTRIN XL) 150 MG 24 hr tablet Take 150 mg by mouth      furosemide (LASIX) 20 MG tablet TAKE 1 TABLET BY MOUTH EVERY 24 HOURS      gabapentin (NEURONTIN) 100 MG capsule TAKE 1 TO 2 CAPSULES BY MOUTH 2-3 TIMES A DAY  1    insulin glargine (BASAGLAR KWIKPEN) 100 UNIT/ML pen Inject 22 Units Subcutaneous      levothyroxine (SYNTHROID/LEVOTHROID) 150 MCG tablet Take 150 mcg by mouth      liraglutide (VICTOZA PEN) 18 MG/3ML solution INJECT 1.8MG UNDER THE SKIN EVERY DAY      metFORMIN (GLUCOPHAGE-XR) 500 MG 24 hr tablet Take 2,000 mg by mouth      metoprolol succinate ER (TOPROL-XL) 50 MG 24 hr tablet TAKE 1 TABLET BY MOUTH TWICE A DAY  0    potassium chloride ER (K-DUR/KLOR-CON M) 20 MEQ CR tablet TAKE 1 TABLET BY MOUTH EVERY 24 HOURS       No current facility-administered medications for this encounter.         Objective:  Vitals:  /64 (BP Location: Left arm, Patient  "Position: Sitting, Cuff Size: Adult Large)   Pulse 67   Temp 96.9  F (36.1  C) (Temporal)   Ht 1.765 m (5' 9.5\")   Wt 102.3 kg (225 lb 9.6 oz)   BMI 32.84 kg/m      A1C: No results found for: \"A1C\"    General:  Patient is alert and orientated.  NAD, kids appropriately.    Dermatologic: Skin overall is slightly shiny, cool but with good turgor and overall coloration.  Do not notice any significant dryness but there is some mild dryness to the lower extremity and feet.  No open fissuring.  Wound as noted below is located to the plantar lateral aspect of the second toe.,  Bleeds well with debridement, no malodor or infectious drainage noted.    .   Wound (used by OP [facility] only) 09/27/24 1339 2 toe Right plantar pressure injury (Active)   Thickness/Stage Stage 3 09/27/24 1300   Base granulating 09/27/24 1300   Periwound macerated 09/27/24 1300   Periwound Temperature warm 09/27/24 1300   Periwound Skin Turgor soft 09/27/24 1300   Edges open 09/27/24 1300   Length (cm) 0.4 09/27/24 1300   Width (cm) 0.2 09/27/24 1300   Depth (cm) 0.3 09/27/24 1300   Wound (cm^2) 0.08 cm^2 09/27/24 1300   Wound Volume (cm^3) 0.02 cm^3 09/27/24 1300   Drainage Characteristics/Odor serosanguineous 09/27/24 1300   Drainage Amount small 09/27/24 1300   Care, Wound debrided 09/27/24 1300          Vascular: DP & PT pulses are difficult to palpate bilateral but no significant edema or varicosities noted.  CFT is less than 3 seconds to the toes, skin temperature overall is slightly cool to touch bilateral lower extremity.     Neurologic: Lower extremity sensation is overall intact to light touch but is diminished distally, did not feel much debridement with 15 blade today.  No evidence of weakness or contracture in the lower extremities.  There is some evidence of neuropathy likely distally     Musculoskeletal: Patient is ambulatory and uses a cane for assistance, overall no major deformity of foot she does have a pes planus foot type " bilaterally with some generalized osteophyte areas over her joints, typical for her age.    Imaging:    No specific imaging based on clinical exam today.    Cultures:    No drainage no need to culture    Assessment:   78-year-old diabetic with wound to plantar toe, this is likely due to to pressure, her first toe is overriding the second which is pushing the second down and over into the third toe likely causing this wound.  I think that a simple Iodosorb dressing covered and a bandage change daily will be good enough for wound care but we need to offload the area.  I recommended silicone toe spacers between the first and second toes.  As well as silicone toe spacer between the second and third toe with spacers between the second and third toe being a priority as this is where the rubbing is occurring.  She is to continue with wider toe box shoes or open toe shoes to prevent rubbing.  Would want her to monitor for signs or symptoms of infection, I believe that with good offloading this has potential to heal.  With debridement today the wound bled well and overall appears healthy.  If with good offloading and good wound care this wound stays stagnant or worsens may warrant getting noninvasive vascular studies.  But at this time we will trial wound care and see how she does.  No need for antibiotic    Plan:    -Iodosorb dressing and bandage change daily  -Monitor for signs or symptoms of infection, present to ED with any signs or symptoms of infection  -Recommend silicone toe spacers to be used between the second and third toe and between the first and second if she can tolerate both, with second and third toe being priority.    -Will see her back in a few weeks for evaluation    Procedure:  After verbal consent, per protocol lidocaine was applied to the wound. Excisional debridement was performed on ulcer.  #15 blade was used to debride ulcer down to and including subcutaneous tissue, and rid slough over wound, wound  is full-thickness to level of subcutaneous tissue. Bleeding controlled with light pressure.  No drainage noted.  < 20sq cm debrided.  Dry dressing applied to foot.  Patient tolerated procedure well.    Luis Belcher DPM            Further instructions from your care team         09/27/2024   Shobha Juares   1945    A DME order for supplies has been placed to [employer]. If there are any issues with your order including not receiving the order please call our clinic at [phone]. Do not call [facility]. We are better able to help you. We can contact the [facility] rep to better assist than if you call the general [facility] number. We can provide a tracking number also if needed.     [facility] is now sending an ancillary kit free of charge. This kit includes gauze, gloves, and saline. You will receive 1 kit per 15 days of the supply order. We typically order 30 days of supplies so you will receive 2 kits. Please let us know if you would not like to receive this kit and we can communicate this to [facility].    Dressing changes outside of clinic are being performed by Patient    Plan 09/27/2024   We highly advise that you purchase silicone toe spacers (from [employer]) and place them between the 1st and 2nd toes and the 2nd and 3rd toes.  Otherwise fold gauze and tuck between your toes to help keep them     Wound Dressing Change: Right plantar second toe  - Wash your hands with soap and water before you begin your dressing change and prepare a clean surface for dressings.  -Cleanse with mild unscented soap and water (such as Cetaphil, Cerave or Dove)   -Primary dressing: Apply a small dab of Iodosorb gel to the wound bed using a q-tip or gauze  -Secondary dressing: Cover with a 2x2.375 Medipore+ Pad  Change daily and as needed with soiling/saturation     Main Provider: Luis Belcher D.P.M. September 27, 2024    Call us at [phone] if you have any questions about your wounds, if you have redness or swelling around  your wound, have a fever of 101 degrees Fahrenheit or greater or if you have any other problems or concerns. We answer the phone Monday through Friday 8 am to 4 pm, please leave a message as we check the voicemail frequently throughout the day. If you have a concern over the weekend, please leave a message and we will return your call Monday. If the need is urgent, go to the ER or urgent care.    If you had a positive experience please indicate that on your patient satisfaction survey form that [facility] will be sending you.    It was a pleasure meeting with you today.  Thank you for allowing me and my team the privilege of caring for you today.  YOU are the reason we are here, and I truly hope we provided you with the excellent service you deserve.  Please let us know if there is anything else we can do for you so that we can be sure you are leaving completely satisfied with your care experience.      If you have any billing related questions please call the [facility] Business office at [phone]. The clinic staff does not handle billing related matters.    If you are scheduled to have a follow up appointment, you will receive a reminder call the day before your visit. On the appointment day please arrive 15 minutes prior to your appointment time. If you are unable to keep that appointment, please call the clinic to cancel or reschedule. If you are more than 10 minutes late or greater for your scheduled appointment time, the clinic policy is that you may be asked to reschedule.

## 2024-09-27 NOTE — DISCHARGE INSTRUCTIONS
09/27/2024   Shobha Juares   1945    A DME order for supplies has been placed to [facility]. If there are any issues with your order including not receiving the order please call our clinic at [phone]. Do not call [facility]. We are better able to help you. We can contact the [facility] rep to better assist than if you call the general [facility] number. We can provide a tracking number also if needed.     [facility] is now sending an ancillary kit free of charge. This kit includes gauze, gloves, and saline. You will receive 1 kit per 15 days of the supply order. We typically order 30 days of supplies so you will receive 2 kits. Please let us know if you would not like to receive this kit and we can communicate this to [facility].    Dressing changes outside of clinic are being performed by Patient    Plan 09/27/2024   We highly advise that you purchase silicone toe spacers (from [employer]) and place them between the 1st and 2nd toes and the 2nd and 3rd toes.  Otherwise fold gauze and tuck between your toes to help keep them     Wound Dressing Change: Right plantar second toe  - Wash your hands with soap and water before you begin your dressing change and prepare a clean surface for dressings.  -Cleanse with mild unscented soap and water (such as Cetaphil, Cerave or Dove)   -Primary dressing: Apply a small dab of Iodosorb gel to the wound bed using a q-tip or gauze  -Secondary dressing: Cover with a 2x2.375 Medipore+ Pad  Change daily and as needed with soiling/saturation     Main Provider: JAYLAN ElizaldePBAUDILIO. September 27, 2024    Call us at [phone] if you have any questions about your wounds, if you have redness or swelling around your wound, have a fever of 101 degrees Fahrenheit or greater or if you have any other problems or concerns. We answer the phone Monday through Friday 8 am to 4 pm, please leave a message as we check the voicemail frequently throughout the day. If you have a concern over the  weekend, please leave a message and we will return your call Monday. If the need is urgent, go to the ER or urgent care.    If you had a positive experience please indicate that on your patient satisfaction survey form that [facility] will be sending you.    It was a pleasure meeting with you today.  Thank you for allowing me and my team the privilege of caring for you today.  YOU are the reason we are here, and I truly hope we provided you with the excellent service you deserve.  Please let us know if there is anything else we can do for you so that we can be sure you are leaving completely satisfied with your care experience.      If you have any billing related questions please call the [facility] Business office at [phone]. The clinic staff does not handle billing related matters.    If you are scheduled to have a follow up appointment, you will receive a reminder call the day before your visit. On the appointment day please arrive 15 minutes prior to your appointment time. If you are unable to keep that appointment, please call the clinic to cancel or reschedule. If you are more than 10 minutes late or greater for your scheduled appointment time, the clinic policy is that you may be asked to reschedule.

## 2024-09-27 NOTE — PROGRESS NOTES
Patient arrived for wound care visit. Certified Wound Care Nurse time spent evaluating patient record, completed a full evaluation and documented wound(s) & jessica-wound skin; provided recommendation based on treatment plan. Applied dressing, reviewed discharge instructions, patient education, and discussed plan of care with appropriate medical team staff members and patient and/or family members.

## 2024-10-18 ENCOUNTER — HOSPITAL ENCOUNTER (OUTPATIENT)
Dept: WOUND CARE | Facility: CLINIC | Age: 79
Discharge: HOME | End: 2024-10-18
Attending: ASSISTANT, PODIATRIC | Admitting: ASSISTANT, PODIATRIC
Payer: MEDICARE

## 2024-10-18 VITALS
SYSTOLIC BLOOD PRESSURE: 149 MMHG | HEART RATE: 78 BPM | DIASTOLIC BLOOD PRESSURE: 78 MMHG | TEMPERATURE: 97.7 F | RESPIRATION RATE: 17 BRPM

## 2024-10-18 DIAGNOSIS — L89.893 PRESSURE ULCER OF TOE OF RIGHT FOOT, STAGE 3 (H): Primary | ICD-10-CM

## 2024-10-18 DIAGNOSIS — L84 CORNS AND CALLOSITIES: ICD-10-CM

## 2024-10-18 DIAGNOSIS — E13.42 DIABETIC POLYNEUROPATHY ASSOCIATED WITH OTHER SPECIFIED DIABETES MELLITUS (H): ICD-10-CM

## 2024-10-18 PROCEDURE — 99213 OFFICE O/P EST LOW 20 MIN: CPT | Mod: 25 | Performed by: ASSISTANT, PODIATRIC

## 2024-10-18 PROCEDURE — 11055 PARING/CUTG B9 HYPRKER LES 1: CPT | Performed by: ASSISTANT, PODIATRIC

## 2024-10-18 NOTE — DISCHARGE INSTRUCTIONS
10/18/2024   Shobha Juares   1945    A DME order was not completed because the patient declined the need for supplies    Dressing changes outside of clinic are being performed by Patient    Plan 10/18/2024   We highly advise that you purchase silicone toe spacers (from [employer]) and place them between the 1st and 2nd toes and the 2nd and 3rd toes.    Otherwise fold gauze and tuck between your toes to help keep them      Wound Dressing Change: Right plantar second toe  - Wash your hands with soap and water before you begin your dressing change and prepare a clean surface for dressings.  - Cleanse with mild unscented soap and water (such as Cetaphil, Cerave or Dove)   - Primary dressing: Apply 1/30 of a 4x5 piece of hydrofera blue transfer ready cut to fit the wound bed  - Secondary dressing: Cover with a 2x2.375 Medipore+ Pad  Change daily and as needed with soiling/saturation         Main Provider: Luis Belcher D.P.M. October 18, 2024    Call us at [phone] if you have any questions about your wounds, if you have redness or swelling around your wound, have a fever of 101 degrees Fahrenheit or greater or if you have any other problems or concerns. We answer the phone Monday through Friday 8 am to 4 pm, please leave a message as we check the voicemail frequently throughout the day. If you have a concern over the weekend, please leave a message and we will return your call Monday. If the need is urgent, go to the ER or urgent care.    If you had a positive experience please indicate that on your patient satisfaction survey form that [facility] will be sending you.    It was a pleasure meeting with you today.  Thank you for allowing me and my team the privilege of caring for you today.  YOU are the reason we are here, and I truly hope we provided you with the excellent service you deserve.  Please let us know if there is anything else we can do for you so that we can be sure you are leaving  completely satisfied with your care experience.      If you have any billing related questions please call the [facility] Business office at [phone]. The clinic staff does not handle billing related matters.    If you are scheduled to have a follow up appointment, you will receive a reminder call the day before your visit. On the appointment day please arrive 15 minutes prior to your appointment time. If you are unable to keep that appointment, please call the clinic to cancel or reschedule. If you are more than 10 minutes late or greater for your scheduled appointment time, the clinic policy is that you may be asked to reschedule.

## 2024-10-18 NOTE — PROGRESS NOTES
[facility] Wound Healing [place] Progress Note    Subject: Patient was seen at wound center.  78-year-old diabetic type 2 female seen today for right plantar second toe wound follow-up.  She has been doing well.  She did buy toe spacers to try to use them, noted some discomfort.  Has been keeping the area clean and covered with a Band-Aid and antibiotic ointment.  She tried to use the other ointment but was having difficulty getting it on.  Denies any signs or symptoms of infection    PMH: No past medical history on file.    Social Hx:   Social History     Socioeconomic History    Marital status:      Spouse name: Not on file    Number of children: Not on file    Years of education: Not on file    Highest education level: Not on file   Occupational History    Not on file   Tobacco Use    Smoking status: Former     Types: Cigarettes    Smokeless tobacco: Never    Tobacco comments:     long time ago, short amount of time   Substance and Sexual Activity    Alcohol use: Not on file    Drug use: Not on file    Sexual activity: Not on file   Other Topics Concern    Not on file   Social History Narrative    Not on file     Social Determinants of Health     Financial Resource Strain: Not on file   Food Insecurity: Not on file   Transportation Needs: Not on file   Physical Activity: Not on file   Stress: Not on file   Social Connections: Not on file   Interpersonal Safety: Low Risk  (10/18/2024)    Interpersonal Safety     Do you feel physically and emotionally safe where you currently live?: Yes     Within the past 12 months, have you been hit, slapped, kicked or otherwise physically hurt by someone?: No     Within the past 12 months, have you been humiliated or emotionally abused in other ways by your partner or ex-partner?: No   Housing Stability: Not on file       Surgical Hx: No past surgical history on file.    Allergies:    Allergies   Allergen Reactions    Oxycodone Itching    Trazodone     Piroxicam Rash     Triamterene Rash     Generalized rash  PN: LW Reaction: Rash, Generalized         Medications:   Current Outpatient Medications   Medication Sig Dispense Refill    amLODIPine (NORVASC) 10 MG tablet Take 10 mg by mouth      aspirin (ASA) 81 MG tablet Take 1 tablet by mouth every 24 hours      atorvastatin (LIPITOR) 20 MG tablet Take 20 mg by mouth daily  3    buPROPion (WELLBUTRIN XL) 150 MG 24 hr tablet Take 150 mg by mouth      furosemide (LASIX) 20 MG tablet TAKE 1 TABLET BY MOUTH EVERY 24 HOURS      gabapentin (NEURONTIN) 100 MG capsule TAKE 1 TO 2 CAPSULES BY MOUTH 2-3 TIMES A DAY  1    insulin glargine (BASAGLAR KWIKPEN) 100 UNIT/ML pen Inject 22 Units Subcutaneous      levothyroxine (SYNTHROID/LEVOTHROID) 150 MCG tablet Take 150 mcg by mouth      liraglutide (VICTOZA PEN) 18 MG/3ML solution INJECT 1.8MG UNDER THE SKIN EVERY DAY      metFORMIN (GLUCOPHAGE-XR) 500 MG 24 hr tablet Take 2,000 mg by mouth      metoprolol succinate ER (TOPROL-XL) 50 MG 24 hr tablet TAKE 1 TABLET BY MOUTH TWICE A DAY  0    potassium chloride ER (K-DUR/KLOR-CON M) 20 MEQ CR tablet TAKE 1 TABLET BY MOUTH EVERY 24 HOURS       No current facility-administered medications for this encounter.         Objective:  Vitals:  BP (!) 149/78 (BP Location: Left arm, Patient Position: Sitting, Cuff Size: Adult Regular)   Pulse 78   Temp 97.7  F (36.5  C) (Temporal)   Resp 17     A1C:     General:  Patient is alert and orientated.  NAD     Dermatologic:     Skin overall is slightly shiny, cool but with good turgor and overall coloration. Do not notice any significant dryness but there is some mild dryness to the lower extremity and feet. No open fissuring. Wound as noted below is located to the plantar lateral aspect of the second toe., Bleeds well with debridement and with good 100% granular tissue  .   Wound (used by OP [facility] only) 09/27/24 1339 2 toe Right plantar pressure injury (Active)   Thickness/Stage Stage 3 10/18/24 1501   Base  granulating 10/18/24 1501   Periwound macerated 10/18/24 1501   Periwound Temperature warm 10/18/24 1501   Periwound Skin Turgor soft 10/18/24 1501   Edges open 10/18/24 1501   Length (cm) 0.4 10/18/24 1501   Width (cm) 0.1 10/18/24 1501   Depth (cm) 0.4 10/18/24 1501   Wound (cm^2) 0.04 cm^2 10/18/24 1501   Wound Volume (cm^3) 0.02 cm^3 10/18/24 1501   Wound healing % 50 10/18/24 1501   Drainage Characteristics/Odor serosanguineous 10/18/24 1501   Drainage Amount small 10/18/24 1501   Care, Wound debrided 10/18/24 1501     Vascular: DP & PT pulses are difficult to palpate bilateral but no significant edema or varicosities noted.  CFT is less than 3 seconds to the toes, skin temperature overall is slightly cool to touch bilateral lower extremity.     Neurologic: Lower extremity sensation is overall intact to light touch but is diminished distally, did not feel much debridement with 15 blade today.  No evidence of weakness or contracture in the lower extremities.  There is some evidence of neuropathy likely distally     Musculoskeletal: Patient is ambulatory and uses a cane for assistance, overall no major deformity of foot she does have a pes planus foot type bilaterally with some generalized osteophyte areas over her joints, typical for her age.      No specific imaging or cultures to review at this time    Assessment:   #Type 2 diabetes mellitus with neuropathy  #Ulcer Right 2nd toe with fat layer exposed    Shobha is doing well overall, she did purchase toe spacers and has been trying to use them as best as she can.  Overall her skin actually looks improved to the area this time.  There is some callus and macerated callus over the right second toe wound.  I do believe it is still being caused by rubbing with the third toe.  We cleaned up the callus around there and debrided the base a little bit today.  We removed fibrotic tissue over the base she has a nice granular bleeding wound base.  Will continue simple  dressing change will use Hydrofera Blue and bandage.  Encouraged her to use gauze in between the toes and silicone sleeves to help offload and offer space between the toes so they do not rub.    Plan:    -Hydrofera Blue dressing as noted below  -Would like her to be off her feet is much as possible  -Monitor for signs or symptoms of infection  -Please use gauze or silicone toe sleeves to help offload between the toes    Procedure:  After verbal consent, per protocol lidocaine was applied to the wound area.  I used a 15 blade to debride the hyperkeratotic tissue around the wound.  I was able to clear this to reveal a granular wound base that does not need to be specifically debrided at this time.  Patient tolerated this well.    Luis Belcher DPM              Further instructions from your care team         10/18/2024   Shobha Juares   1945    A DME order was not completed because the patient declined the need for supplies    Dressing changes outside of clinic are being performed by Patient    Plan 10/18/2024   We highly advise that you purchase silicone toe spacers (from [employer]) and place them between the 1st and 2nd toes and the 2nd and 3rd toes.    Otherwise fold gauze and tuck between your toes to help keep them      Wound Dressing Change: Right plantar second toe  - Wash your hands with soap and water before you begin your dressing change and prepare a clean surface for dressings.  - Cleanse with mild unscented soap and water (such as Cetaphil, Cerave or Dove)   - Primary dressing: Apply 1/30 of a 4x5 piece of hydrofera blue transfer ready cut to fit the wound bed  - Secondary dressing: Cover with a 2x2.375 Medipore+ Pad  Change daily and as needed with soiling/saturation         Main Provider: JAYLAN [email or web address]. October 18, 2024    Call us at [phone] if you have any questions about your wounds, if you have redness or swelling around your wound, have a fever of 101 degrees Fahrenheit or  greater or if you have any other problems or concerns. We answer the phone Monday through Friday 8 am to 4 pm, please leave a message as we check the voicemail frequently throughout the day. If you have a concern over the weekend, please leave a message and we will return your call Monday. If the need is urgent, go to the ER or urgent care.    If you had a positive experience please indicate that on your patient satisfaction survey form that [facility] will be sending you.    It was a pleasure meeting with you today.  Thank you for allowing me and my team the privilege of caring for you today.  YOU are the reason we are here, and I truly hope we provided you with the excellent service you deserve.  Please let us know if there is anything else we can do for you so that we can be sure you are leaving completely satisfied with your care experience.      If you have any billing related questions please call the [facility] Business office at [phone]. The clinic staff does not handle billing related matters.    If you are scheduled to have a follow up appointment, you will receive a reminder call the day before your visit. On the appointment day please arrive 15 minutes prior to your appointment time. If you are unable to keep that appointment, please call the clinic to cancel or reschedule. If you are more than 10 minutes late or greater for your scheduled appointment time, the clinic policy is that you may be asked to reschedule.

## 2024-10-28 NOTE — ADDENDUM NOTE
Encounter addended by: Luis Belcher DPM on: 10/28/2024 1:07 PM   Actions taken: Clinical Note Signed

## 2024-11-08 ENCOUNTER — HOSPITAL ENCOUNTER (OUTPATIENT)
Dept: WOUND CARE | Facility: CLINIC | Age: 79
Discharge: HOME | End: 2024-11-08
Attending: ASSISTANT, PODIATRIC | Admitting: ASSISTANT, PODIATRIC
Payer: MEDICARE

## 2024-11-08 VITALS — TEMPERATURE: 98.7 F | SYSTOLIC BLOOD PRESSURE: 132 MMHG | HEART RATE: 60 BPM | DIASTOLIC BLOOD PRESSURE: 67 MMHG

## 2024-11-08 DIAGNOSIS — L89.893 PRESSURE ULCER OF TOE OF RIGHT FOOT, STAGE 3 (H): Primary | ICD-10-CM

## 2024-11-08 DIAGNOSIS — E11.621: ICD-10-CM

## 2024-11-08 DIAGNOSIS — L97.509: ICD-10-CM

## 2024-11-08 PROCEDURE — 97602 WOUND(S) CARE NON-SELECTIVE: CPT | Performed by: ASSISTANT, PODIATRIC

## 2024-11-08 PROCEDURE — 11042 DBRDMT SUBQ TIS 1ST 20SQCM/<: CPT | Performed by: ASSISTANT, PODIATRIC

## 2024-11-08 PROCEDURE — G0463 HOSPITAL OUTPT CLINIC VISIT: HCPCS | Mod: 25 | Performed by: ASSISTANT, PODIATRIC

## 2024-11-08 PROCEDURE — 99213 OFFICE O/P EST LOW 20 MIN: CPT | Mod: 25 | Performed by: ASSISTANT, PODIATRIC

## 2024-11-08 NOTE — ADDENDUM NOTE
Encounter addended by: Luis Belcher DPM on: 11/8/2024 2:05 PM   Actions taken: Order list changed, Diagnosis association updated

## 2024-11-08 NOTE — PROGRESS NOTES
[facility] Wound Healing [place] Progress Note    Subject: Patient was seen at wound center.  Patient has been doing well since last visit.  She has been using the Hydrofera Blue dressing, states that she believes this may be trapping a little moisture and making the wound worse.  She also notes that her  and was in the hospital recently for about a week and she was doing a lot more walking on hard surfaces in the hospital than she typically does.  Please this may have contributed to some increased maceration.  She does note a little more pain in the toe than typically, and some swelling especially with walking, resolves when she is resting.  No major increase in redness, no fevers, chills, nausea, vomiting, diarrhea recently.    PMH: No past medical history on file.    Social Hx:   Social History     Socioeconomic History    Marital status:      Spouse name: Not on file    Number of children: Not on file    Years of education: Not on file    Highest education level: Not on file   Occupational History    Not on file   Tobacco Use    Smoking status: Former     Types: Cigarettes    Smokeless tobacco: Never    Tobacco comments:     long time ago, short amount of time   Substance and Sexual Activity    Alcohol use: Not on file    Drug use: Not on file    Sexual activity: Not on file   Other Topics Concern    Not on file   Social History Narrative    Not on file     Social Drivers of Health     Financial Resource Strain: Not on file   Food Insecurity: Not on file   Transportation Needs: Not on file   Physical Activity: Not on file   Stress: Not on file   Social Connections: Not on file   Interpersonal Safety: Low Risk  (11/8/2024)    Interpersonal Safety     Do you feel physically and emotionally safe where you currently live?: Yes     Within the past 12 months, have you been hit, slapped, kicked or otherwise physically hurt by someone?: No     Within the past 12 months, have you been humiliated or  emotionally abused in other ways by your partner or ex-partner?: No   Housing Stability: Not on file       Surgical Hx: No past surgical history on file.    Allergies:    Allergies   Allergen Reactions    Oxycodone Itching    Trazodone     Piroxicam Rash    Triamterene Rash     Generalized rash  PN: LW Reaction: Rash, Generalized         Medications:   Current Outpatient Medications   Medication Sig Dispense Refill    amLODIPine (NORVASC) 10 MG tablet Take 10 mg by mouth      aspirin (ASA) 81 MG tablet Take 1 tablet by mouth every 24 hours      atorvastatin (LIPITOR) 20 MG tablet Take 20 mg by mouth daily  3    buPROPion (WELLBUTRIN XL) 150 MG 24 hr tablet Take 150 mg by mouth      furosemide (LASIX) 20 MG tablet TAKE 1 TABLET BY MOUTH EVERY 24 HOURS      gabapentin (NEURONTIN) 100 MG capsule TAKE 1 TO 2 CAPSULES BY MOUTH 2-3 TIMES A DAY  1    insulin glargine (BASAGLAR KWIKPEN) 100 UNIT/ML pen Inject 22 Units Subcutaneous      levothyroxine (SYNTHROID/LEVOTHROID) 150 MCG tablet Take 150 mcg by mouth      liraglutide (VICTOZA PEN) 18 MG/3ML solution INJECT 1.8MG UNDER THE SKIN EVERY DAY      metFORMIN (GLUCOPHAGE-XR) 500 MG 24 hr tablet Take 2,000 mg by mouth      metoprolol succinate ER (TOPROL-XL) 50 MG 24 hr tablet TAKE 1 TABLET BY MOUTH TWICE A DAY  0    potassium chloride ER (K-DUR/KLOR-CON M) 20 MEQ CR tablet TAKE 1 TABLET BY MOUTH EVERY 24 HOURS       No current facility-administered medications for this encounter.         Objective:  Vitals:  /67 (BP Location: Left arm, Patient Position: Sitting, Cuff Size: Adult Regular)   Pulse 60   Temp 98.7  F (37.1  C) (Temporal)       General:  Patient is alert and orientated.  NAD     Dermatologic: Skin overall unchanged from last visit, wound to the plantar aspect of the right plantar second toe with more maceration today, is full-thickness ulceration down to the subcutaneous tissue it is larger in size today and a little bit deeper but I did not appreciate  any probe to bone or open capsule visual within the wound, the wound base is still granular and bleeds very easily.    .   Wound (used by OP [facility] only) 09/27/24 1339 2 toe Right plantar pressure injury (Active)   Thickness/Stage Stage 3 11/08/24 1301   Base granulating 11/08/24 1301   Periwound macerated 11/08/24 1301   Periwound Temperature warm 11/08/24 1301   Periwound Skin Turgor soft 11/08/24 1301   Edges open 11/08/24 1301   Length (cm) 0.3 11/08/24 1301   Width (cm) 0.2 11/08/24 1301   Depth (cm) 0.4 10/18/24 1501   Wound (cm^2) 0.06 cm^2 11/08/24 1301   Wound Volume (cm^3) 0.02 cm^3 10/18/24 1501   Wound healing % 25 11/08/24 1301   Drainage Characteristics/Odor serosanguineous;creamy 11/08/24 1301   Drainage Amount small 11/08/24 1301   Care, Wound debrided 11/08/24 1301          Vascular: DP & PT pulses are difficult to palpate bilateral but no significant edema or varicosities noted.  CFT is less than 3 seconds to the toes, skin temperature overall is slightly cool to touch bilateral lower extremity.     Neurologic: Lower extremity sensation is overall intact to light touch but is diminished distally, did not feel much debridement with 15 blade today.  No evidence of weakness or contracture in the lower extremities.  There is some evidence of neuropathy likely distally     Musculoskeletal: Patient is ambulatory and uses a cane for assistance, overall no major deformity of foot she does have a pes planus foot type bilaterally with some generalized osteophyte areas over her joints, typical for her age.    Imaging: No recent imaging, will order right foot 3 view foot imaging    Cultures: No cultures    Assessment:   # Type 2 diabetes with foot ulcer  # Ulcer right second toe with fat layer exposed    Patient is doing well today, there is increase in size and depth of the wound.  This is likely attributed to patient being on her feet more since last visit due to her 's admission to the hospital.  It is  slightly deeper but I did not appreciate any probe to bone or necrotic wound base.  There is no fluctuance or purulence here today, do not believe she needs any sort of antibiotic.  But I will place an order for a 3 view foot film as I do not believe we have on file three-view foot films here, given she is now has chronic wound here and worsening characteristics, although explainable, would still like baseline foot films here.    Plan:    -Will order three-view foot films for baseline and to evaluate to make sure that the worsening characteristics are not due to underlying infection, although clinically stable  -Recommend she wear her open toe wide shoes is much as possible in order to help offload the area  -Ideally, I would have her weightbearing as minimally as possible  -Dressing instructions as below, will go back to Iodosorb dressing and discontinue Hydrofera Blue    -I want the patient to monitor for any increased signs or symptoms of infection, this would be redness, swelling, drainage from the wound, if she feels ill at any point, if this arises please present to the emergency department for antibiotic and evaluation    Procedure:  After verbal consent, per protocol lidocaine was applied to the wound. Excisional debridement was performed on ulcer removing any wound slough and fibrosis down to the level of the subcutaneous tissue.   #15 blade was used to debride ulcer down to and including subcutaneous tissue. Bleeding controlled with light pressure.  No drainage noted.  < 20sq cm debrided.  Dry dressing applied to foot, Iodosorb over wound.  Patient tolerated procedure well.    Luis Belcher DPM              Further instructions from your care team         11/08/2024   Shobha Juares   1945    A DME order was not completed because the patient declined the need for supplies    Dressing changes outside of clinic are being performed by Patient    Plan 11/08/2024   We will place an order for an x-ray of  your right foot. Please call the scheduling  to schedule your appointment at [facility]: [phone] if they do not contact you  If you are experiencing:  Fevers > 100.8 degrees Farenheit  Chills   Nausea/vomiting  New or worsening pain in or near wounds (Sudden increase in Pain)  Increased redness (fire engine red)  Increased swelling  Changes in drainage (pus)  or other symptoms of concern you should go to the Emergency Department and be evaluated as these symptoms could indicate serious infection.  Continue to use silicone toe spacers - place them between the 1st and 2nd toes and the 2nd and 3rd toes.    Otherwise fold gauze and tuck between your toes to help keep them   Try to limit walking if you are able     Wound Dressing Change: Right plantar second toe  - Wash your hands with soap and water before you begin your dressing change and prepare a clean surface for dressings.  - Cleanse with mild unscented soap and water (such as Cetaphil, Cerave or Dove)   - Primary dressing: Apply a pea sized amount of Iodosorb gel to the wound bed using a q-tip or gauze  - Secondary dressing: Cover with a 2x2.375 Medipore+ Pad or a bandaid  Change daily and as needed with soiling/saturation    A diet high in protein is important for wound healing, we recommend getting 90 grams of protein per day. Taking protein shakes or bars are a good way to get extra protein in your diet.     Good sources of protein:  Pork 26g per 3 oz  Whey protein powder - 24g per scoop (on average)  Greek yogurt - 23g per 8oz   Chicken or Turkey - 23g per 3oz  Fish - 20-25g per 3oz  Beef - 18-23g per 3oz  Tofu - 10g per 1/2 cup  Navy beans - 20g per cup  Cottage cheese - 14g per 1/2 cup   Lentils - 13g per 1/4 cup  Beef jerky 13g per 1oz  2% milk - 8g per cup  Peanut butter - 8g per 2 tablespoons  Eggs - 6g per egg  Mixed nuts - 6g per 2oz       Main Provider: Luis Belcher D.P.M. November 8, 2024    Call us at  [phone] if you have any questions about your wounds, if you have redness or swelling around your wound, have a fever of 101 degrees Fahrenheit or greater or if you have any other problems or concerns. We answer the phone Monday through Friday 8 am to 4 pm, please leave a message as we check the voicemail frequently throughout the day. If you have a concern over the weekend, please leave a message and we will return your call Monday. If the need is urgent, go to the ER or urgent care.    If you had a positive experience please indicate that on your patient satisfaction survey form that [facility] will be sending you.    It was a pleasure meeting with you today.  Thank you for allowing me and my team the privilege of caring for you today.  YOU are the reason we are here, and I truly hope we provided you with the excellent service you deserve.  Please let us know if there is anything else we can do for you so that we can be sure you are leaving completely satisfied with your care experience.      If you have any billing related questions please call the [facility] Business office at [phone]. The clinic staff does not handle billing related matters.    If you are scheduled to have a follow up appointment, you will receive a reminder call the day before your visit. On the appointment day please arrive 15 minutes prior to your appointment time. If you are unable to keep that appointment, please call the clinic to cancel or reschedule. If you are more than 10 minutes late or greater for your scheduled appointment time, the clinic policy is that you may be asked to reschedule.

## 2024-11-08 NOTE — DISCHARGE INSTRUCTIONS
11/08/2024   Shobha Juares   1945    A DME order was not completed because the patient declined the need for supplies    Dressing changes outside of clinic are being performed by Patient    Plan 11/08/2024   We will place an order for an x-ray of your right foot. Please call the scheduling  to schedule your appointment at [facility]: [phone] if they do not contact you  If you are experiencing:  Fevers > 100.8 degrees Farenheit  Chills   Nausea/vomiting  New or worsening pain in or near wounds (Sudden increase in Pain)  Increased redness (fire engine red)  Increased swelling  Changes in drainage (pus)  or other symptoms of concern you should go to the Emergency Department and be evaluated as these symptoms could indicate serious infection.  Continue to use silicone toe spacers - place them between the 1st and 2nd toes and the 2nd and 3rd toes.    Otherwise fold gauze and tuck between your toes to help keep them   Try to limit walking if you are able     Wound Dressing Change: Right plantar second toe  - Wash your hands with soap and water before you begin your dressing change and prepare a clean surface for dressings.  - Cleanse with mild unscented soap and water (such as Cetaphil, Cerave or Dove)   - Primary dressing: Apply a pea sized amount of Iodosorb gel to the wound bed using a q-tip or gauze  - Secondary dressing: Cover with a 2x2.375 Medipore+ Pad or a bandaid  Change daily and as needed with soiling/saturation    A diet high in protein is important for wound healing, we recommend getting 90 grams of protein per day. Taking protein shakes or bars are a good way to get extra protein in your diet.     Good sources of protein:  Pork 26g per 3 oz  Whey protein powder - 24g per scoop (on average)  Greek yogurt - 23g per 8oz   Chicken or Turkey - 23g per 3oz  Fish - 20-25g per 3oz  Beef - 18-23g per 3oz  Tofu - 10g per 1/2 cup  Navy beans - 20g per cup  Cottage cheese  - 14g per 1/2 cup   Lentils - 13g per 1/4 cup  Beef jerky 13g per 1oz  2% milk - 8g per cup  Peanut butter - 8g per 2 tablespoons  Eggs - 6g per egg  Mixed nuts - 6g per 2oz       Main Provider: Luis Belcher D.P.M. November 8, 2024    Call us at [phone] if you have any questions about your wounds, if you have redness or swelling around your wound, have a fever of 101 degrees Fahrenheit or greater or if you have any other problems or concerns. We answer the phone Monday through Friday 8 am to 4 pm, please leave a message as we check the voicemail frequently throughout the day. If you have a concern over the weekend, please leave a message and we will return your call Monday. If the need is urgent, go to the ER or urgent care.    If you had a positive experience please indicate that on your patient satisfaction survey form that [facility] will be sending you.    It was a pleasure meeting with you today.  Thank you for allowing me and my team the privilege of caring for you today.  YOU are the reason we are here, and I truly hope we provided you with the excellent service you deserve.  Please let us know if there is anything else we can do for you so that we can be sure you are leaving completely satisfied with your care experience.      If you have any billing related questions please call the [facility] Business office at [phone]. The clinic staff does not handle billing related matters.    If you are scheduled to have a follow up appointment, you will receive a reminder call the day before your visit. On the appointment day please arrive 15 minutes prior to your appointment time. If you are unable to keep that appointment, please call the clinic to cancel or reschedule. If you are more than 10 minutes late or greater for your scheduled appointment time, the clinic policy is that you may be asked to reschedule.

## 2024-11-13 ENCOUNTER — ANCILLARY PROCEDURE (OUTPATIENT)
Dept: GENERAL RADIOLOGY | Facility: CLINIC | Age: 79
End: 2024-11-13
Attending: ASSISTANT, PODIATRIC
Payer: MEDICARE

## 2024-11-13 DIAGNOSIS — E11.621: ICD-10-CM

## 2024-11-13 DIAGNOSIS — L97.509: ICD-10-CM

## 2024-11-13 DIAGNOSIS — L89.893 PRESSURE ULCER OF TOE OF RIGHT FOOT, STAGE 3 (H): ICD-10-CM

## 2024-11-13 PROCEDURE — 73630 X-RAY EXAM OF FOOT: CPT | Mod: TC | Performed by: RADIOLOGY

## 2024-11-14 NOTE — ADDENDUM NOTE
Encounter addended by: Liana Jiang RN on: 11/14/2024 3:58 PM   Actions taken: Charge Capture section accepted

## 2024-11-26 ENCOUNTER — TELEPHONE (OUTPATIENT)
Dept: WOUND CARE | Facility: CLINIC | Age: 79
End: 2024-11-26
Payer: MEDICARE

## 2024-11-26 DIAGNOSIS — E11.621 TYPE 2 DIABETES MELLITUS WITH FOOT ULCER (CODE) (H): ICD-10-CM

## 2024-11-26 DIAGNOSIS — L89.893 PRESSURE ULCER OF TOE OF RIGHT FOOT, STAGE 3 (H): Primary | ICD-10-CM

## 2024-11-26 NOTE — TELEPHONE ENCOUNTER
Secure chat message sent to Dr. Belcher for xray review and to place the order for home care if he agrees. Awaiting response.

## 2024-11-26 NOTE — TELEPHONE ENCOUNTER
Patient called to follow up on xray she had done a couple weeks ago.  Also had questions on possibly getting home health care set for herself.

## 2024-11-27 NOTE — TELEPHONE ENCOUNTER
Call received from [facility] that they are now not able to accept the referral as Dr. Belcher is not in their Epic system. They recommended having the patients PCP place the home care referral. Call placed to Dr. Kirkland's office and spoke with the nurse that will look into ordering home care/have a face to face visit for home care to be initiated.

## 2024-11-27 NOTE — TELEPHONE ENCOUNTER
[facility] calling to clarify home care orders.  [phone].  Any nurse will be able to help.

## 2024-11-27 NOTE — TELEPHONE ENCOUNTER
Returned call to [facility]. Question of dressing changes as home care is not able to see the patient daily. Ok given for home care to see the patient 2-3 times a week and the patient or family will change the dressing on the days home care does not come. [facility] is able to accept the referral.

## 2024-11-27 NOTE — TELEPHONE ENCOUNTER
Dr. Belcher called and spoke with the patient regarding the xray results. The patient discussed with Dr. Belcher that her  is receiving home care services through [facility] and is requesting a referral be sent there. Will send the referral to [facility].